# Patient Record
Sex: MALE | Race: AMERICAN INDIAN OR ALASKA NATIVE | URBAN - METROPOLITAN AREA
[De-identification: names, ages, dates, MRNs, and addresses within clinical notes are randomized per-mention and may not be internally consistent; named-entity substitution may affect disease eponyms.]

---

## 2019-12-20 ENCOUNTER — NEW PATIENT (OUTPATIENT)
Dept: URBAN - METROPOLITAN AREA CLINIC 59 | Facility: CLINIC | Age: 74
End: 2019-12-20
Payer: MEDICARE

## 2019-12-20 DIAGNOSIS — H00.015 HORDEOLUM, LEFT LOWER LID: Primary | ICD-10-CM

## 2019-12-20 PROCEDURE — 92002 INTRM OPH EXAM NEW PATIENT: CPT | Performed by: OPTOMETRIST

## 2019-12-20 RX ORDER — BACITRACIN ZINC AND POLYMYXIN B SULFATE 500; 10000 [USP'U]/G; [USP'U]/G
OINTMENT OPHTHALMIC
Qty: 0 | Refills: 0 | Status: ACTIVE
Start: 2019-12-20

## 2020-08-11 ENCOUNTER — FOLLOW UP ESTABLISHED (OUTPATIENT)
Dept: URBAN - METROPOLITAN AREA CLINIC 60 | Facility: CLINIC | Age: 75
End: 2020-08-11
Payer: MEDICARE

## 2020-08-11 DIAGNOSIS — H16.292 OTHER KERATOCONJUNCTIVITIS, LEFT EYE: Primary | ICD-10-CM

## 2020-08-11 DIAGNOSIS — H00.022 HORDEOLUM INTERNUM (MEIBOMIAN GLAND DYSFUNCTION), RIGHT LOWER LID: ICD-10-CM

## 2020-08-11 DIAGNOSIS — H00.025 HORDEOLUM INTERNUM (MEIBOMIAN GLAND DYSFUNCTION), LEFT LOWER LID: ICD-10-CM

## 2020-08-11 PROCEDURE — 92012 INTRM OPH EXAM EST PATIENT: CPT | Performed by: OPTOMETRIST

## 2020-08-11 RX ORDER — NEOMYCIN SULFATE, POLYMYXIN B SULFATE AND DEXAMETHASONE 3.5; 10000; 1 MG/ML; [USP'U]/ML; MG/ML
SUSPENSION OPHTHALMIC
Qty: 1 | Refills: 0 | Status: INACTIVE
Start: 2020-08-11 | End: 2020-09-01

## 2020-09-01 ENCOUNTER — FOLLOW UP ESTABLISHED (OUTPATIENT)
Dept: URBAN - METROPOLITAN AREA CLINIC 60 | Facility: CLINIC | Age: 75
End: 2020-09-01
Payer: MEDICARE

## 2020-09-01 DIAGNOSIS — E11.9 TYPE 2 DIABETES MELLITUS WITHOUT COMPLICATIONS: Primary | ICD-10-CM

## 2020-09-01 DIAGNOSIS — H40.013 OPEN ANGLE WITH BORDERLINE FINDINGS, LOW RISK, BILATERAL: ICD-10-CM

## 2020-09-01 DIAGNOSIS — H25.13 AGE-RELATED NUCLEAR CATARACT, BILATERAL: ICD-10-CM

## 2020-09-01 PROCEDURE — 92014 COMPRE OPH EXAM EST PT 1/>: CPT | Performed by: OPTOMETRIST

## 2020-09-01 PROCEDURE — 92133 CPTRZD OPH DX IMG PST SGM ON: CPT | Performed by: OPTOMETRIST

## 2020-09-01 PROCEDURE — 92082 INTERMEDIATE VISUAL FIELD XM: CPT | Performed by: OPTOMETRIST

## 2020-09-01 ASSESSMENT — INTRAOCULAR PRESSURE
OD: 14
OS: 14

## 2021-10-15 ENCOUNTER — OFFICE VISIT (OUTPATIENT)
Dept: URBAN - METROPOLITAN AREA CLINIC 60 | Facility: CLINIC | Age: 76
End: 2021-10-15
Payer: MEDICARE

## 2021-10-15 DIAGNOSIS — Z79.84 LONG TERM (CURRENT) USE OF ORAL ANTIDIABETIC DRUGS: ICD-10-CM

## 2021-10-15 DIAGNOSIS — H52.4 PRESBYOPIA: ICD-10-CM

## 2021-10-15 DIAGNOSIS — H25.813 COMBINED FORMS OF AGE-RELATED CATARACT, BILATERAL: ICD-10-CM

## 2021-10-15 PROCEDURE — 92014 COMPRE OPH EXAM EST PT 1/>: CPT | Performed by: OPTOMETRIST

## 2021-10-15 ASSESSMENT — VISUAL ACUITY
OD: 20/20
OS: 20/20

## 2021-10-15 ASSESSMENT — INTRAOCULAR PRESSURE
OS: 14
OD: 15

## 2021-10-15 NOTE — IMPRESSION/PLAN
Impression: Type 2 diabetes mellitus without complications Plan: No evidence of diabetic retinopathy or diabetic macular edema. Discussed ocular and systemic benefits of blood sugar control. Stressed importance of yearly diabetic eye exams.

## 2021-10-15 NOTE — IMPRESSION/PLAN
Impression: Open angle with borderline findings, low risk, bilateral; secondary to Hernan Roche 76 appearance (no family hx) Plan: Patient educated on findings. Stable IOP. Reviewed previous OCT RNFL, normal OU. No need for drop treatment at this time. Obtain P[place]s at next visit. Monitor.